# Patient Record
Sex: MALE | Race: WHITE | NOT HISPANIC OR LATINO
[De-identification: names, ages, dates, MRNs, and addresses within clinical notes are randomized per-mention and may not be internally consistent; named-entity substitution may affect disease eponyms.]

---

## 2018-11-24 ENCOUNTER — TRANSCRIPTION ENCOUNTER (OUTPATIENT)
Age: 23
End: 2018-11-24

## 2021-01-01 ENCOUNTER — TRANSCRIPTION ENCOUNTER (OUTPATIENT)
Age: 26
End: 2021-01-01

## 2024-04-09 ENCOUNTER — APPOINTMENT (OUTPATIENT)
Dept: ORTHOPEDIC SURGERY | Facility: CLINIC | Age: 29
End: 2024-04-09
Payer: COMMERCIAL

## 2024-04-09 VITALS — BODY MASS INDEX: 36.1 KG/M2 | HEIGHT: 67 IN | WEIGHT: 230 LBS

## 2024-04-09 DIAGNOSIS — Z78.9 OTHER SPECIFIED HEALTH STATUS: ICD-10-CM

## 2024-04-09 PROCEDURE — 99204 OFFICE O/P NEW MOD 45 MIN: CPT

## 2024-04-09 PROCEDURE — 73030 X-RAY EXAM OF SHOULDER: CPT | Mod: LT

## 2024-04-09 PROCEDURE — 73010 X-RAY EXAM OF SHOULDER BLADE: CPT | Mod: LT

## 2024-04-10 NOTE — HISTORY OF PRESENT ILLNESS
[8] : 8 [Sharp] : sharp [Full time] : Work status: full time [de-identified] : 4/9/24: 27yo male presenting with LT shoulder pain. Experiencing clocking and sharp pain when raising LT arm for about 2 weeks. States he did indoor rock climbing about 2 1/2 weeks ago, no fall/injury but noticed pain afterwards. Went to PCP one week ago- x-ray done, recommended Advil and ortho eval if pain continued. Some discomfort at nighttime.     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 Experiencing clocking and sharp pain when raising LT arm for about 2 weeks. States he did indoor rock climbing about 2 1/2 weeks ago, no fall/injury but noticed pain afterwards. Went to PCP one week ago- x-ray done, recommended Advil and ortho eval if pain continued. Some discomfort at nighttime.  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

## 2024-04-10 NOTE — DISCUSSION/SUMMARY
[de-identified] : 27yo M with left biceps tendonitis 1) start PT  2) cryotherapy, rest and activity modification 3) naproxen rx. discussed r/b/a  4) rtc 3-4 weeks, no improvement will eval w MRI

## 2024-04-10 NOTE — PHYSICAL EXAM
[Left] : left shoulder [NL (0-180)] : full active abduction 0-180 degrees [NL (0-70)] : full internal rotation 0-70 degrees [NL (0-90)] : full external rotation 0-90 degrees [5 ___] : forward flexion 5[unfilled]/5 [5___] : internal rotation 5[unfilled]/5 [] : negative impingement testing

## 2024-05-07 ENCOUNTER — APPOINTMENT (OUTPATIENT)
Dept: ORTHOPEDIC SURGERY | Facility: CLINIC | Age: 29
End: 2024-05-07
Payer: COMMERCIAL

## 2024-05-07 VITALS — WEIGHT: 230 LBS | BODY MASS INDEX: 36.1 KG/M2 | HEIGHT: 67 IN

## 2024-05-07 PROCEDURE — 99213 OFFICE O/P EST LOW 20 MIN: CPT

## 2024-05-07 NOTE — DISCUSSION/SUMMARY
[de-identified] : 29yo M with left biceps tendonitis. Persistent pain, failed conservative treatment 1) MRI left shoulder, eval biceps / rotator cuff 2) cryotherapy, rest and activity modification 3) rtc after MRI   Entered by Zhanna Webster acting as scribe. Dr. Alonso- The documentation recorded by the scribe accurately reflects the service I personally performed and the decisions made by me.

## 2024-05-07 NOTE — HISTORY OF PRESENT ILLNESS
[Sharp] : sharp [Full time] : Work status: full time [6] : 6 [de-identified] : 5/7/24: Here to f/up left shoulder. Has been attending PT and has tried naproxen; both with minimal improvement./  4/9/24: 27yo male presenting with LT shoulder pain. Experiencing clocking and sharp pain when raising LT arm for about 2 weeks. States he did indoor rock climbing about 2 1/2 weeks ago, no fall/injury but noticed pain afterwards. Went to PCP one week ago- x-ray done, recommended Advil and ortho eval if pain continued. Some discomfort at nighttime.     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

## 2024-05-11 ENCOUNTER — APPOINTMENT (OUTPATIENT)
Dept: MRI IMAGING | Facility: CLINIC | Age: 29
End: 2024-05-11
Payer: COMMERCIAL

## 2024-05-11 PROCEDURE — 73221 MRI JOINT UPR EXTREM W/O DYE: CPT | Mod: LT

## 2024-05-15 ENCOUNTER — RX RENEWAL (OUTPATIENT)
Age: 29
End: 2024-05-15

## 2024-05-15 RX ORDER — NAPROXEN 500 MG/1
500 TABLET ORAL TWICE DAILY
Qty: 60 | Refills: 0 | Status: ACTIVE | COMMUNITY
Start: 2024-04-09 | End: 1900-01-01

## 2024-05-21 ENCOUNTER — APPOINTMENT (OUTPATIENT)
Dept: ORTHOPEDIC SURGERY | Facility: CLINIC | Age: 29
End: 2024-05-21
Payer: COMMERCIAL

## 2024-05-21 VITALS — HEIGHT: 67 IN | WEIGHT: 230 LBS | BODY MASS INDEX: 36.1 KG/M2

## 2024-05-21 DIAGNOSIS — M75.22 BICIPITAL TENDINITIS, LEFT SHOULDER: ICD-10-CM

## 2024-05-21 PROCEDURE — 99214 OFFICE O/P EST MOD 30 MIN: CPT

## 2024-05-21 RX ORDER — METHYLPREDNISOLONE 4 MG/1
4 TABLET ORAL
Qty: 1 | Refills: 0 | Status: ACTIVE | COMMUNITY
Start: 2024-05-21 | End: 1900-01-01

## 2024-05-21 NOTE — HISTORY OF PRESENT ILLNESS
[6] : 6 [Dull/Aching] : dull/aching [Intermittent] : intermittent [Nothing helps with pain getting better] : Nothing helps with pain getting better [Full time] : Work status: full time [de-identified] : 5/21/24: Here to f/up left shoulder and review MRI results.  5/7/24: Here to f/up left shoulder. Has been attending PT and has tried naproxen; both with minimal improvement./  4/9/24: 27yo male presenting with LT shoulder pain. Experiencing clocking and sharp pain when raising LT arm for about 2 weeks. States he did indoor rock climbing about 2 1/2 weeks ago, no fall/injury but noticed pain afterwards. Went to PCP one week ago- x-ray done, recommended Advil and ortho eval if pain continued. Some discomfort at nighttime.     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

## 2024-05-21 NOTE — DATA REVIEWED
[MRI] : MRI [Left] : left [Shoulder] : shoulder [Report was reviewed and noted in the chart] : The report was reviewed and noted in the chart [I independently reviewed and interpreted images and report] : I independently reviewed and interpreted images and report [I reviewed the films/CD] : I reviewed the films/CD [FreeTextEntry1] : 05.11.24: Moderate biceps tendinitis. Mild ac joint hypertrophy. No rotator cuff or labral tear.

## 2024-05-21 NOTE — DISCUSSION/SUMMARY
[de-identified] : 27yo M with left biceps tendonitis 1) MDP rx 2) resume PT 3) cryotherapy, rest and activity modification  4) rtc 6 weeks  Entered by Zhanna Webster acting as scribe. Dr. Alonso- The documentation recorded by the scribe accurately reflects the service I personally performed and the decisions made by me.

## 2024-07-02 ENCOUNTER — APPOINTMENT (OUTPATIENT)
Dept: ORTHOPEDIC SURGERY | Facility: CLINIC | Age: 29
End: 2024-07-02